# Patient Record
Sex: FEMALE | Race: WHITE | NOT HISPANIC OR LATINO | Employment: UNEMPLOYED | URBAN - METROPOLITAN AREA
[De-identification: names, ages, dates, MRNs, and addresses within clinical notes are randomized per-mention and may not be internally consistent; named-entity substitution may affect disease eponyms.]

---
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
## 2025-05-28 ENCOUNTER — TELEPHONE (OUTPATIENT)
Dept: FAMILY MEDICINE CLINIC | Facility: CLINIC | Age: 20
End: 2025-05-28
Payer: COMMERCIAL

## 2025-05-28 NOTE — TELEPHONE ENCOUNTER
Caller: Karla Sahni    Relationship: Mother    Best call back number: [phone]     What is the best time to reach you: ANY    Who are you requesting to speak with (clinical staff, provider,  specific staff member): CLINICAL STAFF      What was the call regarding:PATIENT IS NEEDING VERIFICATION OF HEP B VACCINATION, PATIENTS MOTHER WOULD LIKE TO KNOW IF [facility] HAS RECORD OF THIS IF SO CAN THIS BE PICKED UP. PLEASE CALL AND ADVISE.

## 2025-05-28 NOTE — TELEPHONE ENCOUNTER
Immunization record printed from [facility]. Spoke with patient's mom Karla to let her know it would be available to  at the front office.

## 2025-05-30 ENCOUNTER — OFFICE VISIT (OUTPATIENT)
Dept: FAMILY MEDICINE CLINIC | Facility: CLINIC | Age: 20
End: 2025-05-30
Payer: COMMERCIAL

## 2025-05-30 VITALS
BODY MASS INDEX: 45.99 KG/M2 | HEIGHT: 67 IN | WEIGHT: 293 LBS | DIASTOLIC BLOOD PRESSURE: 84 MMHG | OXYGEN SATURATION: 97 % | TEMPERATURE: 97.1 F | SYSTOLIC BLOOD PRESSURE: 128 MMHG | HEART RATE: 111 BPM

## 2025-05-30 DIAGNOSIS — E66.01 MORBID (SEVERE) OBESITY DUE TO EXCESS CALORIES: Primary | ICD-10-CM

## 2025-05-30 DIAGNOSIS — R63.5 WEIGHT GAIN: ICD-10-CM

## 2025-05-30 NOTE — PROGRESS NOTES
Subjective   Rina Sahni is a 20 y.o. female.     Weight Management       She has gained about 50 pounds since she had her nexplanon placed about 2 years ago  She has not been exercising as much, no longer playing basketball  She has not been eating healthy, works night shift    50 pounds since 6/2023    The following portions of the patient's history were reviewed and updated as appropriate: allergies, current medications, past family history, past medical history, past social history, past surgical history, and problem list.    Review of Systems   Constitutional: Negative.        Objective   Physical Exam  Vitals and nursing note reviewed.   Constitutional:       General: She is not in acute distress.     Appearance: Normal appearance. She is well-developed.   Cardiovascular:      Rate and Rhythm: Normal rate and regular rhythm.      Heart sounds: Normal heart sounds.   Pulmonary:      Effort: Pulmonary effort is normal.      Breath sounds: Normal breath sounds.   Neurological:      Mental Status: She is alert and oriented to person, place, and time.   Psychiatric:         Mood and Affect: Mood normal.         Behavior: Behavior normal.         Thought Content: Thought content normal.         Judgment: Judgment normal.       Assessment & Plan   Diagnoses and all orders for this visit:    1. Morbid (severe) obesity due to excess calories (Primary)  -     Tirzepatide-Weight Management (ZEPBOUND) 2.5 MG/0.5ML solution auto-injector; Inject 0.5 mL under the skin into the appropriate area as directed 1 (One) Time Per Week.  Dispense: 2 mL; Refill: 1    2. Weight gain  -     CBC & Differential  -     Comprehensive Metabolic Panel  -     FSH & LH  -     Estrogens, Total  -     TSH  -     Progesterone    She has gained a significant amount of weight the past couple years.  Will check labs including hormone levels    She has not been playing basketball and is working and not eating very healthy.  I suspect these are the  cause of her weight gain.    Will start zepbound.  Pros/cons/SE of medicine discussed healthy eating and exercise encouraged.  Will slowly advance meds and recheck in 4 months

## 2025-05-31 LAB
ALBUMIN SERPL-MCNC: 4.4 G/DL (ref 3.5–5.2)
ALBUMIN/GLOB SERPL: 1.5 G/DL
ALP SERPL-CCNC: 103 U/L (ref 39–117)
ALT SERPL-CCNC: 31 U/L (ref 1–33)
AST SERPL-CCNC: 25 U/L (ref 1–32)
BASOPHILS # BLD AUTO: 0.06 10*3/MM3 (ref 0–0.2)
BASOPHILS NFR BLD AUTO: 0.4 % (ref 0–1.5)
BILIRUB SERPL-MCNC: 0.3 MG/DL (ref 0–1.2)
BUN SERPL-MCNC: 11 MG/DL (ref 6–20)
BUN/CREAT SERPL: 17.2 (ref 7–25)
CALCIUM SERPL-MCNC: 9.8 MG/DL (ref 8.6–10.5)
CHLORIDE SERPL-SCNC: 101 MMOL/L (ref 98–107)
CO2 SERPL-SCNC: 24 MMOL/L (ref 22–29)
CREAT SERPL-MCNC: 0.64 MG/DL (ref 0.57–1)
EGFRCR SERPLBLD CKD-EPI 2021: 129.9 ML/MIN/1.73
EOSINOPHIL # BLD AUTO: 0.22 10*3/MM3 (ref 0–0.4)
EOSINOPHIL NFR BLD AUTO: 1.5 % (ref 0.3–6.2)
ERYTHROCYTE [DISTWIDTH] IN BLOOD BY AUTOMATED COUNT: 13.4 % (ref 12.3–15.4)
ESTROGEN SERPL-MCNC: 215 PG/ML
FSH SERPL-ACNC: 3.8 MIU/ML
GLOBULIN SER CALC-MCNC: 2.9 GM/DL
GLUCOSE SERPL-MCNC: 91 MG/DL (ref 65–99)
HCT VFR BLD AUTO: 39.6 % (ref 34–46.6)
HGB BLD-MCNC: 13.1 G/DL (ref 12–15.9)
IMM GRANULOCYTES # BLD AUTO: 0.07 10*3/MM3 (ref 0–0.05)
IMM GRANULOCYTES NFR BLD AUTO: 0.5 % (ref 0–0.5)
LH SERPL-ACNC: 4.3 MIU/ML
LYMPHOCYTES # BLD AUTO: 4.72 10*3/MM3 (ref 0.7–3.1)
LYMPHOCYTES NFR BLD AUTO: 32.4 % (ref 19.6–45.3)
MCH RBC QN AUTO: 30.1 PG (ref 26.6–33)
MCHC RBC AUTO-ENTMCNC: 33.1 G/DL (ref 31.5–35.7)
MCV RBC AUTO: 91 FL (ref 79–97)
MONOCYTES # BLD AUTO: 0.78 10*3/MM3 (ref 0.1–0.9)
MONOCYTES NFR BLD AUTO: 5.4 % (ref 5–12)
NEUTROPHILS # BLD AUTO: 8.71 10*3/MM3 (ref 1.7–7)
NEUTROPHILS NFR BLD AUTO: 59.8 % (ref 42.7–76)
NRBC BLD AUTO-RTO: 0 /100 WBC (ref 0–0.2)
PLATELET # BLD AUTO: 284 10*3/MM3 (ref 140–450)
POTASSIUM SERPL-SCNC: 3.7 MMOL/L (ref 3.5–5.2)
PROGEST SERPL-MCNC: 0.1 NG/ML
PROT SERPL-MCNC: 7.3 G/DL (ref 6–8.5)
RBC # BLD AUTO: 4.35 10*6/MM3 (ref 3.77–5.28)
SODIUM SERPL-SCNC: 140 MMOL/L (ref 136–145)
TSH SERPL DL<=0.005 MIU/L-ACNC: 2.13 UIU/ML (ref 0.27–4.2)
WBC # BLD AUTO: 14.56 10*3/MM3 (ref 3.4–10.8)

## 2025-06-02 ENCOUNTER — TELEPHONE (OUTPATIENT)
Dept: FAMILY MEDICINE CLINIC | Facility: CLINIC | Age: 20
End: 2025-06-02
Payer: COMMERCIAL

## 2025-06-02 DIAGNOSIS — E66.01 MORBID (SEVERE) OBESITY DUE TO EXCESS CALORIES: Primary | ICD-10-CM

## 2025-06-02 NOTE — TELEPHONE ENCOUNTER
MYCHART MSG:    So i’ve decided to start the shot and i’ve also decided i’d like to speak with a nutritionist, is there one you could recommend for me?

## 2025-06-03 ENCOUNTER — TELEPHONE (OUTPATIENT)
Dept: FAMILY MEDICINE CLINIC | Facility: CLINIC | Age: 20
End: 2025-06-03
Payer: COMMERCIAL

## 2025-06-03 NOTE — TELEPHONE ENCOUNTER
Caller: Karla Sahni    Relationship to patient: Mother      Best call back number:[phone]     Provider: Rudy Ovalle MD      Medication PA needed:     Tirzepatide-Weight Management (ZEPBOUND) 2.5 MG/0.5ML solution auto-injector     Reason for call/Prior Auth: PATIENTS MOTHER STATES THAT THE PATIENT IS NEEDING A PRIOR AUTHORIZATION SENT TO THE PHARMACY IN ORDER TO GET THE MEDICATION. PATIENT MOTHERS STATES THAT [facility] STATES THE DOCTOR NEEDS TO SEND THEM A PRIOR AUTHORIZATION [phone]

## 2025-06-21 DIAGNOSIS — E66.01 MORBID (SEVERE) OBESITY DUE TO EXCESS CALORIES: Primary | ICD-10-CM

## 2025-06-25 DIAGNOSIS — E66.01 MORBID (SEVERE) OBESITY DUE TO EXCESS CALORIES: ICD-10-CM

## 2025-06-26 RX ORDER — TIRZEPATIDE 5 MG/.5ML
5 INJECTION, SOLUTION SUBCUTANEOUS WEEKLY
Qty: 2 ML | Refills: 0 | Status: SHIPPED | OUTPATIENT
Start: 2025-06-26

## 2025-06-27 ENCOUNTER — HOSPITAL ENCOUNTER (OUTPATIENT)
Dept: NUTRITION | Facility: HOSPITAL | Age: 20
Setting detail: RECURRING SERIES
Discharge: HOME | End: 2025-06-27

## 2025-06-27 PROCEDURE — 97802 MEDICAL NUTRITION INDIV IN: CPT

## 2025-06-27 NOTE — CONSULTS
[facility]          Initial 60 Minute Nutrition Visit    Date: 2025   Patient Name: Rina Sahni  : 2005   MRN: [id number]   Referring Provider: Rudy Ovalle MD    Reason for Visit: Weight management  Visit Format: IP    Nutrition Assessment       Social History:   Social History     Socioeconomic History    Marital status: Single   Tobacco Use    Smoking status: Never    Smokeless tobacco: Never   Vaping Use    Vaping status: Never Used   Substance and Sexual Activity    Alcohol use: Never    Drug use: Never    Sexual activity: Defer     Comment: single     Active Problem List:   Patient Active Problem List    Diagnosis     Childhood obesity, BMI  percentile [[id number]]       Current Medications:   Current Outpatient Medications:     cetirizine (zyrTEC) 10 MG tablet, Take 1 tablet by mouth Daily., Disp: , Rfl:     cholecalciferol (VITAMIN D3) 25 MCG (1000 UT) tablet, Take 1 tablet by mouth Daily., Disp: , Rfl:     Etonogestrel (NEXPLANON) 68 MG implant subdermal implant, 1 each by Other route 1 (One) Time., Disp: , Rfl:     fluconazole (DIFLUCAN) 150 MG tablet, TAKE 1 TABLET BY MOUTH ONCE FOR A 1 TIME DOSE. REPEAT IN 3 DAYS., Disp: , Rfl:     fluocinonide (LIDEX) 0.05 % external solution, APPLY TO THE AFFECTED AREA(S) TOPICALLY 2 TIMES PER DAY FOR 14 DAYS, THEN AS NEEDED FOR FLARES, Disp: , Rfl:     fluticasone (FLONASE) 50 MCG/ACT nasal spray, 2 sprays Daily., Disp: , Rfl:     ketoconazole (NIZORAL) 2 % cream, APPLY TO THE AFFECTED AREA(S) TOPICALLY TWICE DAILY FOR 4 WEEKS, Disp: , Rfl:     ketoconazole (NIZORAL) 2 % shampoo, APPLY TO THE AFFECTED AREA(S), LATHER, LEAVE IN PLACE FOR 5 MINUTES, AND THEN RINSE OFF WITH WATER ONCE WEEKLY, Disp: , Rfl:     metroNIDAZOLE (FLAGYL) 500 MG tablet, Take 1 tablet by mouth Every 12 (Twelve) Hours., Disp: , Rfl:     mupirocin (BACTROBAN) 2 % ointment, USE WITH TRIAMCINOLONE TWICE DAIY FOR 2 WEEKS, Disp: , Rfl:     terbinafine  (lamiSIL) 250 MG tablet, Take 1 tablet by mouth Daily., Disp: , Rfl:     Tirzepatide-Weight Management (Zepbound) 5 MG/0.5ML solution auto-injector, Inject 0.5 mL under the skin into the appropriate area as directed 1 (One) Time Per Week., Disp: 2 mL, Rfl: 0    triamcinolone (KENALOG) 0.1 % ointment, APPLY TO AFFECTED AREA(S) TWICE DAILY FOR UP TO TWO WEEKS. TAKE A TWO WEEK BREAK, REPEAT AS NEEDED. NOT FOR FACE,GROIN,AXILLAE, AND SKIN FOLDS, Disp: , Rfl:     Labs: all recent labwork available to [facility]    Hunger Vital Sign Food Insecurity Assessment:  Within the past 12 months I/we worried whether our food would run out before I/we got money to buy more: No   Within the past 12 months the food I/we bought just didn't last and I/we didn't have money to get more: No   Use of food assistance programs (WIC, food stamps, food olivera) No       Food & Nutrition Related History       Food Allergies: peanuts  Food Intolerances: None indicated  Food Behavior: None  Nutrition Impact Symptoms: None indicated  Gastrointestinal conditions that impact intake or food choices: None  Details at home: 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   Who prepares most meals: Self; family  Who does grocery shopping: Self; family  How many meals are purchased from fast food/sit down restaurants per week: Daily  Difficulty chewin - Normal  Difficulty swallowin - Normal  Diet requirement related to personal preference or cultural belief: None indicated  History of eating disorder/disordered eating habits: None  Language/communication details: English  Barriers to learning: No barriers identified at this time    24 Hour Recall:   What did you have for breakfast? NA   How much?    What did you have for a snack? NA   How much?    What did you have for lunch? Ham [place], Chips   How much? 1 and not sure   What did you have for a snack? NA   How much?    What did you have for dinner? Chicken and pineapple  "  How much? 2 and about 10 cubes   What did you have for a snack? Goldfish   How much? About 50 give or take     Additional comments: Rina drinks mostly water (40oz water bottle that she refills several times per day) and occasionally juice with water to provide flavor    Anthropometrics      Height:   Ht Readings from Last 1 Encounters:   05/30/25 170.2 cm (67\")     Weight:   Wt Readings from Last 3 Encounters:   05/30/25 136 kg (299 lb)   03/22/24 118 kg (261 lb 3.2 oz) (>99%, Z= 2.56)*   01/10/24 118 kg (260 lb) (>99%, Z= 2.55)*     * Growth percentiles are based on [facility] (Girls, 2-20 Years) data.     BMI: 46.83  Weight Change: See above     Physical Activity     Physical activity comments: Rina played basketball from the time she was young through her first year of college. She decided that she wanted to focus more on school so left the basketball team. She has not been exercising consistently since that time but is interested in starting again. RD suggested starting with one day/week to create the habit and build upon that.      Estimated Needs     Estimated Energy Needs: 1900-2000kcal/day (1.2, -500)    Estimated Protein Needs: 100-125g/day (20-25%)     Estimated Fluid Needs: Minimum of 64oz water/day     Discussion / Education      Rina is a 20 year old female referred for weight management. She reports that her biggest challenge is nighttime eating and her motivation is to achieves sustainable weight loss. 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 During this time, she was not educated about how to appropriately fuel her body for basketball nor was she prepared for changes to her body once she stopped playing. Rina describes texture concerns with foods as well as eating out frequently, which limits healthy choices. Due to her job, she works a consistent night shift schedule which does not allow for " typical eating patterns. Rina reports that she has never eaten a morning meal, even while an athlete, as it upset her stomach. Her first meal varies but she does try to eat by 10-11am with a snack at 2pm on days that she works. For non-work days, she usually does not eat until 2pm. Dinners are generally between 8-9pm. Rina recall participating in culinary classes in high school and how much she enjoyed cooking during that time. She is interested in meal planning and would like more information/recommendations.     During this appointment, the following was discussed:    Macronutrients: what they are, why they are important, and how to incorporate into a balanced snack/meal.   Carbs: further reviewed difference between complex and simple carbs and that it is the type of carb that is important. Focus on eating fiber rich carbs with each snack/meal, being mindful of servings.   Fiber: where it is found and why it is important for weight loss, blood sugar regulations, GI health and lowering cholesterol. No target amount discussed today; will focus on in future f/u appointments.   Fats: reviewed the differences between saturated and unsaturated fats, examples of both and how to incorporate unsaturated fats into a balanced diet.   Protein: Eating protein with every snack and meal to create the habit. Will discuss target numbers in future follow-up appointments.   Nutrition label: Reviewed serving sizes, fats, carbs (fiber and added sugars), protein, sodium. RD discussed the percentages and how to use these numbers to decide if a food item is high/low in saturated fat, sodium and added sugar as well as fiber.   4-6 smaller snacks/meals would be ideal: Recommended making every snack/meal count by incorporating a balanced plate of lean or plant based proteins (examples provided) with plenty of fruits, vegetables and whole grains. RD discussed aiming for a minimum of two consistent meals with 1-3 protein/fiber snacks included  each day to provide consistent energy intake and to help regulate blood sugar levels as well as control hunger.   Meal plan/prep: RD provided recommendations for plan/prepping meal to have available, fully cooked and ready to eat or to prep ingredients (large batch of rice, protein, vegetables) that can be used in different recipes throughout the week. RD emphasized the importance of a grocery list and/or using the pick-up option at the grocery store to prevent impulse buying or forgetting items needed for meal prep. Rina wishes to start with one meal per week, which RD feels is a good idea in order to enable success.   Swaps: Due to increased frequency of meals eaten away from the home, RD feels that making protein swaps is a good place to start. Grilled chicken sandwich instead of breaded at [employer] and eating 1 breaded chicken finger but removing breading from other two chicken fingers when eating at [employer]. RD suggested sides that can be incorporated such as side salads, kale crunch salad, fruit but prefers Rina focus on the protein choice to start.   Hydration: Rina is currently drinking ~80-120oz of water daily.   Baby steps: by making gradual changes and focusing on 1-2 goals per month, forming successful habits is more realistic and sustainable.      RD provided contact information and encouraged Rina to reach out with any questions.     Assessment of patient engagement: Engaged    Measurement of understanding: Patient verbalized understanding    Resources Provided: [facility] Healthy Meal and Snack Ideas and High Fiber Food List; Macros; Nutrition Label; MyPlate Fiber; Lowering Cholesterol and TG; Med Plate Method; RD to provide meal plan recommendations    Goal (s)      Goal 1: Meal plan/prep for at least one meal per week.      Goal 2: When eating out, aim to change the protein from fried/breaded to grilled/baked.      Plan of Care     PES Statement:   Food and nutrition related to knowledge deficit  related to no prior nutrition education as evidenced by recall and conversation.     Follow Up Visit      Follow Up:   Thursday, July 31st at 11:45am, IP    Total of 60 minutes spent with patient on nutrition counseling. Education based on Academy of Nutrition and Dietetics guidelines. Patient was provided with RD's contact information. Thank you for this referral.

## 2025-08-28 ENCOUNTER — TELEPHONE (OUTPATIENT)
Dept: FAMILY MEDICINE CLINIC | Facility: CLINIC | Age: 20
End: 2025-08-28
Payer: COMMERCIAL

## 2025-08-28 RX ORDER — TIRZEPATIDE 5 MG/.5ML
5 INJECTION, SOLUTION SUBCUTANEOUS WEEKLY
Qty: 2 ML | Refills: 0 | Status: SHIPPED | OUTPATIENT
Start: 2025-08-28